# Patient Record
Sex: MALE | Race: WHITE | Employment: FULL TIME | URBAN - METROPOLITAN AREA
[De-identification: names, ages, dates, MRNs, and addresses within clinical notes are randomized per-mention and may not be internally consistent; named-entity substitution may affect disease eponyms.]

---

## 2022-08-11 ENCOUNTER — APPOINTMENT (OUTPATIENT)
Dept: CT IMAGING | Age: 37
End: 2022-08-11
Payer: COMMERCIAL

## 2022-08-11 ENCOUNTER — HOSPITAL ENCOUNTER (EMERGENCY)
Age: 37
Discharge: HOME | End: 2022-08-12
Attending: EMERGENCY MEDICINE
Payer: COMMERCIAL

## 2022-08-11 VITALS
RESPIRATION RATE: 14 BRPM | HEIGHT: 72 IN | DIASTOLIC BLOOD PRESSURE: 79 MMHG | BODY MASS INDEX: 27.77 KG/M2 | OXYGEN SATURATION: 97 % | SYSTOLIC BLOOD PRESSURE: 124 MMHG | HEART RATE: 60 BPM | WEIGHT: 205 LBS | TEMPERATURE: 97.9 F

## 2022-08-11 DIAGNOSIS — S05.31XA: Primary | ICD-10-CM

## 2022-08-11 DIAGNOSIS — T15.91XA FOREIGN BODY OF RIGHT EYE, INITIAL ENCOUNTER: ICD-10-CM

## 2022-08-11 LAB
ABSOLUTE EOS #: 0.2 K/UL (ref 0–0.4)
ABSOLUTE LYMPH #: 3.9 K/UL (ref 1–4.8)
ABSOLUTE MONO #: 0.9 K/UL (ref 0.1–1.3)
ALBUMIN SERPL-MCNC: 4.9 G/DL (ref 3.5–5.2)
ALP BLD-CCNC: 87 U/L (ref 40–129)
ALT SERPL-CCNC: 31 U/L (ref 5–41)
ANION GAP SERPL CALCULATED.3IONS-SCNC: 8 MMOL/L (ref 9–17)
AST SERPL-CCNC: 27 U/L
BASOPHILS # BLD: 1 % (ref 0–2)
BASOPHILS ABSOLUTE: 0.1 K/UL (ref 0–0.2)
BILIRUB SERPL-MCNC: 0.63 MG/DL (ref 0.3–1.2)
BUN BLDV-MCNC: 11 MG/DL (ref 6–20)
CALCIUM SERPL-MCNC: 10 MG/DL (ref 8.6–10.4)
CHLORIDE BLD-SCNC: 104 MMOL/L (ref 98–107)
CO2: 29 MMOL/L (ref 20–31)
CREAT SERPL-MCNC: 0.93 MG/DL (ref 0.7–1.2)
EOSINOPHILS RELATIVE PERCENT: 2 % (ref 0–4)
GFR AFRICAN AMERICAN: >60 ML/MIN
GFR NON-AFRICAN AMERICAN: >60 ML/MIN
GFR SERPL CREATININE-BSD FRML MDRD: ABNORMAL ML/MIN/{1.73_M2}
GLUCOSE BLD-MCNC: 101 MG/DL (ref 70–99)
HCT VFR BLD CALC: 43 % (ref 41–53)
HEMOGLOBIN: 14.9 G/DL (ref 13.5–17.5)
LYMPHOCYTES # BLD: 41 % (ref 24–44)
MCH RBC QN AUTO: 31.1 PG (ref 26–34)
MCHC RBC AUTO-ENTMCNC: 34.7 G/DL (ref 31–37)
MCV RBC AUTO: 89.5 FL (ref 80–100)
MONOCYTES # BLD: 9 % (ref 1–7)
PDW BLD-RTO: 13.5 % (ref 11.5–14.9)
PLATELET # BLD: 325 K/UL (ref 150–450)
PMV BLD AUTO: 7.9 FL (ref 6–12)
POTASSIUM SERPL-SCNC: 4.1 MMOL/L (ref 3.7–5.3)
RBC # BLD: 4.8 M/UL (ref 4.5–5.9)
SEG NEUTROPHILS: 47 % (ref 36–66)
SEGMENTED NEUTROPHILS ABSOLUTE COUNT: 4.5 K/UL (ref 1.3–9.1)
SODIUM BLD-SCNC: 141 MMOL/L (ref 135–144)
TOTAL PROTEIN: 7.9 G/DL (ref 6.4–8.3)
WBC # BLD: 9.6 K/UL (ref 3.5–11)

## 2022-08-11 PROCEDURE — 6360000002 HC RX W HCPCS: Performed by: PHYSICIAN ASSISTANT

## 2022-08-11 PROCEDURE — 90471 IMMUNIZATION ADMIN: CPT | Performed by: PHYSICIAN ASSISTANT

## 2022-08-11 PROCEDURE — 36415 COLL VENOUS BLD VENIPUNCTURE: CPT

## 2022-08-11 PROCEDURE — 70480 CT ORBIT/EAR/FOSSA W/O DYE: CPT

## 2022-08-11 PROCEDURE — 80053 COMPREHEN METABOLIC PANEL: CPT

## 2022-08-11 PROCEDURE — 6360000002 HC RX W HCPCS: Performed by: EMERGENCY MEDICINE

## 2022-08-11 PROCEDURE — 2580000003 HC RX 258: Performed by: PHYSICIAN ASSISTANT

## 2022-08-11 PROCEDURE — 96372 THER/PROPH/DIAG INJ SC/IM: CPT

## 2022-08-11 PROCEDURE — 2500000003 HC RX 250 WO HCPCS: Performed by: PHYSICIAN ASSISTANT

## 2022-08-11 PROCEDURE — 85025 COMPLETE CBC W/AUTO DIFF WBC: CPT

## 2022-08-11 PROCEDURE — 2580000003 HC RX 258

## 2022-08-11 PROCEDURE — 96366 THER/PROPH/DIAG IV INF ADDON: CPT

## 2022-08-11 PROCEDURE — 96367 TX/PROPH/DG ADDL SEQ IV INF: CPT

## 2022-08-11 PROCEDURE — 96365 THER/PROPH/DIAG IV INF INIT: CPT

## 2022-08-11 PROCEDURE — 90715 TDAP VACCINE 7 YRS/> IM: CPT | Performed by: PHYSICIAN ASSISTANT

## 2022-08-11 PROCEDURE — 6370000000 HC RX 637 (ALT 250 FOR IP): Performed by: PHYSICIAN ASSISTANT

## 2022-08-11 PROCEDURE — 99284 EMERGENCY DEPT VISIT MOD MDM: CPT

## 2022-08-11 RX ORDER — CEPHALEXIN 500 MG/1
500 CAPSULE ORAL 2 TIMES DAILY
Qty: 20 CAPSULE | Refills: 0 | Status: SHIPPED | OUTPATIENT
Start: 2022-08-11 | End: 2022-08-21

## 2022-08-11 RX ORDER — LINEZOLID 2 MG/ML
600 INJECTION, SOLUTION INTRAVENOUS ONCE
Status: COMPLETED | OUTPATIENT
Start: 2022-08-11 | End: 2022-08-11

## 2022-08-11 RX ORDER — PROPARACAINE HYDROCHLORIDE 5 MG/ML
1 SOLUTION/ DROPS OPHTHALMIC ONCE
Status: COMPLETED | OUTPATIENT
Start: 2022-08-11 | End: 2022-08-11

## 2022-08-11 RX ADMIN — TETANUS TOXOID, REDUCED DIPHTHERIA TOXOID AND ACELLULAR PERTUSSIS VACCINE, ADSORBED 0.5 ML: 5; 2.5; 8; 8; 2.5 SUSPENSION INTRAMUSCULAR at 16:30

## 2022-08-11 RX ADMIN — LINEZOLID 600 MG: 600 INJECTION, SOLUTION INTRAVENOUS at 21:59

## 2022-08-11 RX ADMIN — FLUORESCEIN SODIUM 1 EACH: 0.6 STRIP OPHTHALMIC at 15:23

## 2022-08-11 RX ADMIN — CEFTRIAXONE SODIUM 1000 MG: 1 INJECTION, POWDER, FOR SOLUTION INTRAMUSCULAR; INTRAVENOUS at 20:27

## 2022-08-11 RX ADMIN — PROPARACAINE HYDROCHLORIDE 1 DROP: 5 SOLUTION/ DROPS OPHTHALMIC at 15:23

## 2022-08-11 RX ADMIN — WATER 10 ML: 1 INJECTION INTRAMUSCULAR; INTRAVENOUS; SUBCUTANEOUS at 20:27

## 2022-08-11 ASSESSMENT — PAIN - FUNCTIONAL ASSESSMENT
PAIN_FUNCTIONAL_ASSESSMENT: 0-10
PAIN_FUNCTIONAL_ASSESSMENT: ACTIVITIES ARE NOT PREVENTED

## 2022-08-11 ASSESSMENT — VISUAL ACUITY
OS: 20/200
OD: 20/50
OU: 20/40

## 2022-08-11 ASSESSMENT — PAIN DESCRIPTION - PAIN TYPE: TYPE: ACUTE PAIN

## 2022-08-11 ASSESSMENT — ENCOUNTER SYMPTOMS
NAUSEA: 0
EYE PAIN: 1
EYE REDNESS: 1
SHORTNESS OF BREATH: 0
VOMITING: 0

## 2022-08-11 ASSESSMENT — PAIN DESCRIPTION - ORIENTATION: ORIENTATION: RIGHT

## 2022-08-11 ASSESSMENT — PAIN DESCRIPTION - DESCRIPTORS: DESCRIPTORS: DISCOMFORT

## 2022-08-11 ASSESSMENT — PAIN DESCRIPTION - LOCATION: LOCATION: EYE

## 2022-08-11 ASSESSMENT — PAIN SCALES - GENERAL: PAINLEVEL_OUTOF10: 4

## 2022-08-11 NOTE — ED PROVIDER NOTES
EMERGENCY DEPARTMENT ENCOUNTER   ATTENDING ATTESTATION     Pt Name: Opal Rocha  MRN: [id number]  [place] 1985  Date of evaluation: 8/11/22   Opal Rocha is a 29 y.o. male with CC: Foreign Body in Eye (Metal in right eye. Was at work when this happened. Also got hit in left eyelid with metal fragment. )    MDM: 30-year-old male presents for concern for foreign body in the right thigh. Patient reports that he was using metal bradford at work was not wearing eye protection and a piece of metal flew and hit him in the right thigh states that it went right into the eye and was unable to get out complaining of pain, he does wear contacts at home states that he took the contact out and is having blurry vision of the right eye but that is consistent with him not wearing his contacts. Patient is unsure of last tetanus shot, will update tetanus    On exam, is noted to have an abrasion over the right eyelid, no evidence of Chelsea sign, foreign body was not really visualized, concern for retained foreign body, CT orbit was obtained    CT showing small metallic fragments in the right orbit adjacent to the right globe    Consulted with ophthalmology Dr. Vega Burrell, who reports he will come and evaluate patient    Discussed with Dr. Vega Burrell in ED who is recommending patient be transferred to [facility] for ophthalmology evaluation and concern for need for surgery to remove foreign body    Patient was attempted to be transferred to [facility] and did discuss with their ophthalmologist who is recommending higher level of care    Will attempt transfer to [address] [place] [address],[address]    Patient was signed out to oncoming physician Dr. Dario Florez, pending discussion with [address] [place] [address],[address] and likely transfer      This visit was performed by both a physician and an APC. I personally evaluated and examined the patient. I performed all aspects of the MDM as documented.     ED Course as of 08/11/22 2705   Thu Aug 11, 2022   1700 CT ORBITS WO CONTRAST  Will consult ophthalmology [ET]   1954 Dr. Radha Arias, ophthalmology, examined patient and recommending transfer to tertiary care center to admit for IV abx and likely surgery to remove foreign body from right eye and left upper eyelid [ET]      ED Course User Index  [ET] AUGUSTO Shafer       CRITICAL CARE:       EKG: All EKG's are interpreted by the Emergency Department Physician who either signs or Co-signs this chart in the absence of a cardiologist.      RADIOLOGY:All plain film, CT, MRI, and formal ultrasound images (except ED bedside ultrasound) are read by the radiologist, see reports below, unless otherwise noted in MDM or here. CT ORBITS WO CONTRAST   Final Result   Small metallic fragments in the right orbit adjacent to the right globe, left   upper eyelid, and left frontal scalp soft tissues. LABS: All lab results were reviewed by myself, and all abnormals are listed below. Labs Reviewed   COMPREHENSIVE METABOLIC PANEL - Abnormal; Notable for the following components:       Result Value    Glucose 101 (*)     Anion Gap 8 (*)     All other components within normal limits   CBC WITH AUTO DIFFERENTIAL - Abnormal; Notable for the following components:    Monocytes 9 (*)     All other components within normal limits     CONSULTS:  PHARMACY TO DOSE VANCOMYCIN  FINAL IMPRESSION      1. Scleral laceration of right eye, initial encounter    2. Foreign body of right eye, initial encounter            PASTMEDICAL HISTORY   History reviewed. No pertinent past medical history. SURGICAL HISTORY       Past Surgical History:   Procedure Laterality Date    BACK SURGERY      EYE SURGERY       CURRENT MEDICATIONS       Previous Medications    No medications on file     ALLERGIES     is allergic to pcn [penicillins] and vancomycin. FAMILY HISTORY     has no family status information on file.       SOCIAL HISTORY             [facility], DO  The care is provided during an unprecedented national emergency due to the novel coronavirus, COVID 19.   Attending Emergency Physician         David Tapia DO  08/11/22 9610

## 2022-08-11 NOTE — ED NOTES
[place] [address] [id number] Dr Kasia Lorenzana talked to [facility]. 's ER attending     Donta Moe  08/11/22 2002

## 2022-08-11 NOTE — ED PROVIDER NOTES
EMERGENCY DEPARTMENT ENCOUNTER    Pt Name: Leonidas Shields  MRN: [id number]  [place] 1985  Date of evaluation: 8/11/22  CHIEF COMPLAINT       Chief Complaint   Patient presents with    Foreign Body in Eye     Metal in right eye. Was at work when this happened. Also got hit in left eyelid with metal fragment. HISTORY OF PRESENT ILLNESS   The history is provided by the patient. Patient presents straight from work, injury occurred 20 mins prior to presenting here. He was using bradford not wearing eye protection when a sliver of metal flew up and hit him in the right eye. He believes it went straight into his eye and didn't come out. He complains of pain. Unsure of his visual acuity because he wears a contact in that eye and immediately removed it. States he can see very little out of that eye without his contact in so difficult to tell if it is worse. He was also hit on his left upper eyelid by another metal fragment. He has a little swelling and bruising where it hit him, but no laceration. He thinks he had a tetanus shot a couple of years ago but doesn't know for sure and is comfortable getting another one if needed. REVIEW OF SYSTEMS     Review of Systems   Eyes:  Positive for pain, redness and visual disturbance. Respiratory:  Negative for shortness of breath. Cardiovascular:  Negative for chest pain. Gastrointestinal:  Negative for nausea and vomiting. Neurological:  Negative for dizziness and headaches. Psychiatric/Behavioral:  Negative for confusion. All other systems reviewed and are negative. PASTMEDICAL HISTORY   History reviewed. No pertinent past medical history. Past Problem List  Patient Active Problem List   Diagnosis Code    Scleral laceration of right eye S05. 31XA       SURGICAL HISTORY     History reviewed. No pertinent surgical history. CURRENT MEDICATIONS       No current facility-administered medications on file prior to encounter.      No current outpatient Thought Content: Thought content normal.         Judgment: Judgment normal.     MEDICAL DECISION MAKING:   Patient has sensation of pain and foreign body in right eye, linear fluorescein uptake with surrounding swelling/bleeding in right sclera on exam, concern for laceration / IOFB. Tetanus updated today. Will likely  need some abx prophylaxis. CT scan obtained, metallic fragments noted adjacent to right globe and in left upper eyelid. Patient provided with an aluminum eye shield. Ophthalmologist paged for treatment recommendations. Spoke with Dr. Edi Conklin. He will come in and examine patient personally. Patient updated. Also advised to remain NPO until we know the plan. Patient examined by ophtho, needs IV abx and transfer to tertiary care center. I put in orders for abx (ceftriaxone & vanco) and basic labs (CBC, CMP), patient signed over to Dr. Jose De Jesus Powell who initiated transfer request and will assume care of patient until transfer complete. PROCEDURES:  Procedures  DIAGNOSTIC RESULTS   EKG:All EKG's are interpreted by the Emergency Department Physician who either signs or Co-signs this chart in the absence of a cardiologist.    RADIOLOGY:All plain film, CT, MRI, and formal ultrasound images (except ED bedside ultrasound) are read by the radiologist, see reports below, unless otherwisenoted in MDM or here. CT ORBITS WO CONTRAST   Final Result   Small metallic fragments in the right orbit adjacent to the right globe, left   upper eyelid, and left frontal scalp soft tissues. No results found. LABS: All lab results were reviewed by myself.   Labs Reviewed   COMPREHENSIVE METABOLIC PANEL   CBC WITH AUTO DIFFERENTIAL       EMERGENCY DEPARTMENTCOURSE:   Vitals:    Vitals:    08/11/22 1515 08/11/22 1519   BP: 137/86    Pulse: 77    Resp: 14    Temp: 97.9 °F (36.6 °C)    TempSrc: Oral    SpO2: 97%    Weight: 195 lb (88.5 kg) 205 lb (93 kg)   Height: 5' 10\" (1.778 m) 6' (1.829 m)     ED Course as of 08/11/22 1957   Thu Aug 11, 2022   1100 Maurilio Miller  Will consult ophthalmology [ET]   1954 Dr. Jenna Mancuso, ophthalmology, examined patient and recommending transfer to tertiary care center to admit for IV abx and likely surgery to remove foreign body from right eye and left upper eyelid [ET]      ED Course User Index  [ET] AUGUSTO Henry     The patient was given the following medications while in the emergency department:  Orders Placed This Encounter   Medications    proparacaine (ALCAINE) 0.5 % ophthalmic solution 1 drop    fluorescein ophthalmic strip 1 each    Tetanus-Diphth-Acell Pertussis (BOOSTRIX) injection 0.5 mL    sterile water injection     [place]: carltont override    cefTRIAXone (ROCEPHIN) 1,000 mg in dextrose 5 % 50 mL IVPB mini-bag     Order Specific Question:   Antimicrobial Indications     Answer: Other     Order Specific Question:   Other Abx Indication     Answer:   scleral laceration     CONSULTS:  PHARMACY TO DOSE VANCOMYCIN    FINAL IMPRESSION      1. Scleral laceration of right eye, initial encounter    2. Foreign body of right eye, initial encounter          DISPOSITION/PLAN   DISPOSITION Decision To Transfer 08/11/2022 07:45:49 PM      Evaluation and treatment course in the ED, and plan of care upon discharge was discussed in length with the patient/patient representative. Patient/patient representative had no further questions prior to being discharged and was instructed to return to the ED for new or worsening symptoms. Any changes to existing medications or new prescriptions were reviewed with patient/patient representative and they expressed understanding of how to correctly take their medications and the possible side effects. PATIENT REFERRED TO:  No follow-up provider specified.   DISCHARGE MEDICATIONS:  New Prescriptions    No medications on file     The care is provided during an unprecedented national emergency due to the novel coronavirus, COVID [address] [facility] TIMOTEO, Sergio Dikcson [address] [place] [address]  08/11/22 1958

## 2022-08-11 NOTE — CONSULTS
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 He was not wearing safety glasses at the time, even though he usually does. Was wearing SCL at the time. He took CL out immediately. Currently OD somewhat blurrred with mild FB sensation and redness temporally OD. Denies flashes, floaters or a major change in vision. Past eye hx  Previous R obital fracture repair. Wears CL's     Vision with near Card  Without correction OD   20/30  With CL correction. 20/25    Tonopen    18 OD     19 OS    SLE  OD conj injection surrounding a 2-3 mm laceration of the conjunctiva. Trace subconj heme. FB not visible  Neg Chelsea's sign  Area involved is approx 3/4 inch horizontal and 1/2 inch vertical.  This area of injection is approximately 12 mm posterior to the limbus. OD cornea normal, no abrasion, no CL  OD AC  no hyphema or hypopyon  OD lens clear, no cataract    OS, normal conj, cornea AC lens  Left upper lid ecchymosis 1/2 at lash margin temporally. Fund CD = 0.3 OU normal mac/ periphery OU  OD has no vit heme no  obvious intraocular FB, unable to see if there is any peripheral retinal heme or edema. CT Scan report  Small metallic fragment in the right orbit adjacent to the right globe, left upper eyelid and left frontal scaltp soft tissues. IMP:   Scleral Metalic FB OD unable to determine how deep into the sclera it has gone. No clear cut IOFB by exam or CT, and unable to see any Chelsea's sign. Conj injection and heme obscuring direct view. Possible partial or complete laceration of sclera. I simply cannot tell  Conjunctival laceration overlying the FB  Metalic FB in Let upper lid with ecchymosis of left upper lid. REC Transfer to [facility] for further evaluation possible surgical exploration, FB removal R sclera and left upper lid, potential need for scleral sutures and or retinal cryo and or laser. Begin IV vancomycin. Carter TRAVIS Ketty Garcia MD

## 2022-08-12 NOTE — ED NOTES
2116 Dr Jose De Jesus Powell said that [facility] called back, they are having the Transfer Medical Officer call back and Dr Rhiannon Adams is going to be talking to them.        Jose Juan Hidalgo  08/11/22 2118

## 2022-08-12 NOTE — ED NOTES
2037 Called [facility] talked to Alan Ayers to find out about ophthalmology, they have not heard from Dr Bhavin Donaldson they are going to send out another page now.      Danya Lyons  08/11/22 2040

## 2022-08-12 NOTE — ED NOTES
2046 Hollis Linn from [facility] called me to let me know she was on the call with ophthalmology  and that Dr Demetrius Oakes refused the pt. Dr Tiffani Montero wants us to call [address] [place] [address],[address] called so Hollis Linn stated that she is going to go ahead and start the case and contact [facility]. and will call me back. If she has any questions.      Iveth De León  08/11/22 2052

## 2022-08-12 NOTE — ED NOTES
2007 Spoke with [facility] about transportation, [facility] stated that pt was accepted and they would be setting up transportation. 2008 Dr Sanna Carlson had me call back to [facility] to make sure that the Opthamologist  has accepted the pt before we send the pt over.     2012 Marcus at [facility] said that Evelyn Adame paged Dr. Cesra Soriano the opthamologist.     Stover Robert  08/11/22 2014

## 2022-08-12 NOTE — ED NOTES
Patient states he is allergic to Vancomycin. Gets hives and redness. Dr Grande Flow notified.        Omega Cornejo RN  08/11/22 2114

## 2022-08-12 NOTE — ED PROVIDER NOTES
FACULTY SIGN-OUT  ADDENDUM     Care of this patient was assumed from Dr. Tiffani Montero. The patient was seen for Foreign Body in Eye (Metal in right eye. Was at work when this happened. Also got hit in left eyelid with metal fragment. )  . The patient's initial evaluation and plan have been discussed with the prior provider who initially evaluated the patient. Nursing Notes, Past Medical Hx, Past Surgical Hx, Social Hx, Allergies, and Family Hx were all reviewed. ED COURSE      The patient was given the following medications:  Orders Placed This Encounter   Medications    proparacaine (ALCAINE) 0.5 % ophthalmic solution 1 drop    fluorescein ophthalmic strip 1 each    Tetanus-Diphth-Acell Pertussis (BOOSTRIX) injection 0.5 mL    sterile water injection     [place]: cabinet override    cefTRIAXone (ROCEPHIN) 1,000 mg in dextrose 5 % 50 mL IVPB mini-bag     Order Specific Question:   Antimicrobial Indications     Answer: Other     Order Specific Question:   Other Abx Indication     Answer:   scleral laceration    DISCONTD: vancomycin (VANCOCIN) 2,250 mg in dextrose 5 % 500 mL IVPB     Order Specific Question:   Antimicrobial Indications     Answer:   Head and Neck Infection    DISCONTD: vancomycin (VANCOCIN) intermittent dosing (placeholder)     Order Specific Question:   Antimicrobial Indications     Answer:   Head and Neck Infection    linezolid (ZYVOX) IVPB 600 mg     Order Specific Question:   Antimicrobial Indications     Answer:   Head and Neck Infection    cephALEXin (KEFLEX) 500 MG capsule     Sig: Take 1 capsule by mouth in the morning and 1 capsule before bedtime. Do all this for 10 days.      Dispense:  20 capsule     Refill:  0       Labs Reviewed   COMPREHENSIVE METABOLIC PANEL - Abnormal; Notable for the following components:       Result Value    Glucose 101 (*)     Anion Gap 8 (*)     All other components within normal limits   CBC WITH AUTO DIFFERENTIAL - Abnormal; Notable for the following components:    Monocytes 9 (*)     All other components within normal limits       CT ORBITS WO CONTRAST    Result Date: 8/11/2022  EXAMINATION: CT OF THE ORBITS WITHOUT CONTRAST 8/11/2022 TECHNIQUE: CT of the orbits was performed without the administration of intravenous contrast. Multiplanar reformatted images are provided for review. Automated exposure control, iterative reconstruction, and/or weight based adjustment of the mA/kV was utilized to reduce the radiation dose to as low as reasonably achievable. COMPARISON: None. HISTORY: ORDERING SYSTEM PROVIDED HISTORY: possible metal sliver in right eye TECHNOLOGIST PROVIDED HISTORY: 1-2mm cuts through orbit possible metal sliver in right eye Decision Support Exception - unselect if not a suspected or confirmed emergency medical condition->Emergency Medical Condition (MA) Reason for Exam: metal sliver in right eye and left eyelid per patient from [place] FINDINGS: ORBITS: The globes appear intact. The extraocular muscles, optic nerve sheath complexes and lacrimal glands appear unremarkable. No retrobulbar hematoma or mass is seen. SOFT TISSUES: There is small metallic foreign objects in the left frontal scalp soft tissues and the bilateral periorbital soft tissues. The left orbital fragment appears to be within the upper eyelid. The right orbital fragment is adjacent to the right globe. BRAIN: The visualized portion of the intracranial contents appear unremarkable. SINUSES: Scattered mucosal thickening in the paranasal sinuses. BONES: No acute osseous abnormality is seen. Small metallic fragments in the right orbit adjacent to the right globe, left upper eyelid, and left frontal scalp soft tissues. RECENT VITALS:   Temp: 97.9 °F (36.6 °C), Heart Rate: 60, Resp: 14, BP: 124/79    MEDICAL DECISION MAKING       I did speak with Dr. Anthony Lerner at  of [facility] emergency department.   She relates he would be happy to accept the patient however they are holding 50 admissions in the emergency department and so she is not sure if he will be able to come today or tomorrow. She is requesting that we give the [facility] a call and speak with the ophthalmologist on-call to see if they would feel that this patient needs to be bumped ahead of the line and come emergently to be seen or if tomorrow is appropriate. I was able to speak with Dr. Lady Scheuermann, ophthalmologist who relates he is really unable to say with certainty without seeing the patient officially whether he needs certain particular care. However he does relate to me that if the patient were to call the [facility] tomorrow at 8 AM that they would definitely work the patient in tomorrow. He is not certain what time the patient would be seen if he has to wait for the emergency department. I did speak with patient and his wife at bedside and they relate that they would be comfortable going home and calling at 8 AM and going up to [facility] for this [facility]. I think this is probably the best option as the patient may be waiting 1 to 2 days for transfer to [facility] emergency department from the sounds of it. We also discussed what to return to the emergency department for as well.     Impression:Scleral and conjunctival laceration with foreign body of the globe  Disposition: Nanci Sosa MD  Emergency Medicine Attending      Camilo Og MD  08/11/22 8476